# Patient Record
Sex: FEMALE | Race: WHITE
[De-identification: names, ages, dates, MRNs, and addresses within clinical notes are randomized per-mention and may not be internally consistent; named-entity substitution may affect disease eponyms.]

---

## 2018-02-15 ENCOUNTER — HOSPITAL ENCOUNTER (OUTPATIENT)
Dept: HOSPITAL 90 - SHCH | Age: 83
Discharge: HOME | End: 2018-02-15
Attending: INTERNAL MEDICINE
Payer: MEDICARE

## 2018-02-15 DIAGNOSIS — I48.91: Primary | ICD-10-CM

## 2018-02-15 PROCEDURE — 93306 TTE W/DOPPLER COMPLETE: CPT

## 2018-03-07 ENCOUNTER — HOSPITAL ENCOUNTER (OUTPATIENT)
Dept: HOSPITAL 90 - OIH | Age: 83
Discharge: HOME | End: 2018-03-07
Attending: INTERNAL MEDICINE
Payer: COMMERCIAL

## 2018-03-07 DIAGNOSIS — Z13.6: Primary | ICD-10-CM

## 2018-03-07 PROCEDURE — 75571 CT HRT W/O DYE W/CA TEST: CPT

## 2021-12-22 ENCOUNTER — HOSPITAL ENCOUNTER (EMERGENCY)
Dept: HOSPITAL 47 - EC | Age: 86
Discharge: HOME | End: 2021-12-22
Payer: MEDICARE

## 2021-12-22 VITALS — DIASTOLIC BLOOD PRESSURE: 51 MMHG | SYSTOLIC BLOOD PRESSURE: 136 MMHG | HEART RATE: 58 BPM

## 2021-12-22 VITALS — RESPIRATION RATE: 16 BRPM

## 2021-12-22 VITALS — TEMPERATURE: 97.9 F

## 2021-12-22 DIAGNOSIS — Z20.822: ICD-10-CM

## 2021-12-22 DIAGNOSIS — Z86.73: ICD-10-CM

## 2021-12-22 DIAGNOSIS — I50.9: Primary | ICD-10-CM

## 2021-12-22 DIAGNOSIS — Z79.01: ICD-10-CM

## 2021-12-22 DIAGNOSIS — E78.5: ICD-10-CM

## 2021-12-22 DIAGNOSIS — Z79.899: ICD-10-CM

## 2021-12-22 LAB
ALBUMIN SERPL-MCNC: 4.2 G/DL (ref 3.5–5)
ALP SERPL-CCNC: 73 U/L (ref 38–126)
ALT SERPL-CCNC: 14 U/L (ref 4–34)
ANION GAP SERPL CALC-SCNC: 11 MMOL/L
AST SERPL-CCNC: 36 U/L (ref 14–36)
BUN SERPL-SCNC: 16 MG/DL (ref 7–17)
CALCIUM SPEC-MCNC: 9.6 MG/DL (ref 8.4–10.2)
CELLS COUNTED: 100
CHLORIDE SERPL-SCNC: 104 MMOL/L (ref 98–107)
CK SERPL-CCNC: 76 U/L (ref 30–135)
CO2 SERPL-SCNC: 21 MMOL/L (ref 22–30)
ERYTHROCYTE [DISTWIDTH] IN BLOOD BY AUTOMATED COUNT: 4.35 M/UL (ref 3.8–5.4)
ERYTHROCYTE [DISTWIDTH] IN BLOOD: 15.4 % (ref 11.5–15.5)
GLUCOSE SERPL-MCNC: 106 MG/DL (ref 74–99)
HCT VFR BLD AUTO: 43.7 % (ref 34–46)
HGB BLD-MCNC: 14.2 GM/DL (ref 11.4–16)
LYMPHOCYTES # BLD MANUAL: 2.05 K/UL (ref 1–4.8)
MAGNESIUM SPEC-SCNC: 1.8 MG/DL (ref 1.6–2.3)
MCH RBC QN AUTO: 32.6 PG (ref 25–35)
MCHC RBC AUTO-ENTMCNC: 32.5 G/DL (ref 31–37)
MCV RBC AUTO: 100.4 FL (ref 80–100)
MONOCYTES # BLD MANUAL: 0.51 K/UL (ref 0–1)
NEUTROPHILS NFR BLD MANUAL: 60 %
NEUTS SEG # BLD MANUAL: 3.84 K/UL (ref 1.3–7.7)
PLATELET # BLD AUTO: 197 K/UL (ref 150–450)
POTASSIUM SERPL-SCNC: 4.6 MMOL/L (ref 3.5–5.1)
PROT SERPL-MCNC: 8 G/DL (ref 6.3–8.2)
SODIUM SERPL-SCNC: 136 MMOL/L (ref 137–145)
WBC # BLD AUTO: 6.4 K/UL (ref 3.8–10.6)

## 2021-12-22 PROCEDURE — 87635 SARS-COV-2 COVID-19 AMP PRB: CPT

## 2021-12-22 PROCEDURE — 36415 COLL VENOUS BLD VENIPUNCTURE: CPT

## 2021-12-22 PROCEDURE — 82550 ASSAY OF CK (CPK): CPT

## 2021-12-22 PROCEDURE — 99283 EMERGENCY DEPT VISIT LOW MDM: CPT

## 2021-12-22 PROCEDURE — 83735 ASSAY OF MAGNESIUM: CPT

## 2021-12-22 PROCEDURE — 71046 X-RAY EXAM CHEST 2 VIEWS: CPT

## 2021-12-22 PROCEDURE — 85025 COMPLETE CBC W/AUTO DIFF WBC: CPT

## 2021-12-22 PROCEDURE — 87636 SARSCOV2 & INF A&B AMP PRB: CPT

## 2021-12-22 PROCEDURE — 83880 ASSAY OF NATRIURETIC PEPTIDE: CPT

## 2021-12-22 PROCEDURE — 80053 COMPREHEN METABOLIC PANEL: CPT

## 2021-12-22 NOTE — ED
URI HPI





- General


Chief Complaint: Upper Respiratory Infection


Stated Complaint: covid+, bam infusion


Time Seen by Provider: 12/22/21 13:43


Source: patient, family, RN notes reviewed


Mode of arrival: wheelchair


Limitations: no limitations





- History of Present Illness


Initial Comments: 





BA-year-old female with history of CVA who is brought in by daughter after being

diagnosed with being tired having difficulty breathing.  Patient does have 

peripheral edema she recently had her Lasix dosage lowered from twice a day to 

once a day.  No fevers chills nausea vomiting sweats just tired for last couple 

days.  Patient is hard of hearing.


MD Complaint: other





- Related Data


                                Home Medications











 Medication  Instructions  Recorded  Confirmed


 


Acetaminophen Tab [Tylenol Tab] 500 mg PO Q6HR PRN 12/22/21 12/22/21


 


Apixaban [Eliquis] 2.5 mg PO BID@0800,2000 12/22/21 12/22/21


 


Atorvastatin [Lipitor] 40 mg PO HS@2000 12/22/21 12/22/21


 


Bacitracin Ointment 1 applic TOPICAL TID PRN 12/22/21 12/22/21


 


Furosemide [Lasix] 40 mg PO DAILY@0800 12/22/21 12/22/21


 


Losartan Potassium [Cozaar] 25 mg PO DAILY@0800 12/22/21 12/22/21


 


Magnesium Hydroxide [Milk of 2,400 mg PO Q4H PRN 12/22/21 12/22/21





Magnesia]   


 


Magnesium Oxide [Mag-Ox] 400 mg PO DAILY@0800 12/22/21 12/22/21


 


Metoprolol Tartrate [Lopressor] 100 mg PO DAILY@0800 12/22/21 12/22/21


 


Polyethylene Glycol 3350 [Miralax] 17 gm PO DAILY@0800 12/22/21 12/22/21


 


Potassium Chloride ER [K-Dur 10] 10 meq PO DAILY@0800 12/22/21 12/22/21


 


Sennosides/Docusate Sodium 1 tab PO BID PRN 12/22/21 12/22/21





[Senna-S 8.6-50 mg Tablet]   


 


amLODIPine [Norvasc] 10 mg PO DAILY@0800 12/22/21 12/22/21








                                  Previous Rx's











 Medication  Instructions  Recorded


 


Furosemide [Lasix] 40 mg PO BID #14 tablet 12/22/21


 


Potassium Chloride ER [K-Dur 20] 20 meq PO DAILY #7 tab 12/22/21











                                    Allergies











Allergy/AdvReac Type Severity Reaction Status Date / Time


 


No Known Allergies Allergy   Verified 12/22/21 15:20














Review of Systems


ROS Statement: 


Those systems with pertinent positive or pertinent negative responses have been 

documented in the HPI.





ROS Other: All systems not noted in ROS Statement are negative.





Past Medical History


Past Medical History: Atrial Flutter, CVA/TIA, Hyperlipidemia


History of Any Multi-Drug Resistant Organisms: None Reported


Past Surgical History: No Surgical Hx Reported


Additional Past Surgical History / Comment(s): cataracts


Past Psychological History: No Psychological Hx Reported


Smoking Status: Never smoker


Past Alcohol Use History: None Reported


Past Drug Use History: None Reported





General Exam





- General Exam Comments


Initial Comments: 





This is a well-developed asthenic appearing female who is awake alert oriented 

3


Limitations: no limitations


General appearance: alert, in no apparent distress


Head exam: Present: atraumatic, normocephalic, normal inspection


Eye exam: Present: normal appearance, PERRL, EOMI.  Absent: scleral icterus, 

conjunctival injection, periorbital swelling


ENT exam: Present: normal exam, mucous membranes moist


Neck exam: Present: normal inspection, full ROM, other.  Absent: tenderness, 

meningismus, lymphadenopathy


Respiratory exam: Present: rales, decreased breath sounds (No stridor JVD or 

bruits).  Absent: respiratory distress, wheezes, rhonchi, stridor


Cardiovascular Exam: Present: regular rate, normal rhythm, normal heart sounds. 

Absent: systolic murmur, diastolic murmur, rubs, gallop, clicks


GI/Abdominal exam: Present: soft, normal bowel sounds.  Absent: distended, 

tenderness, guarding, rebound, rigid


Extremities exam: Present: full ROM, normal capillary refill, pedal edema.  

Absent: tenderness, joint swelling, calf tenderness


Back exam: Present: normal inspection


Neurological exam: Present: alert, oriented X3, CN II-XII intact


Psychiatric exam: Present: normal affect, normal mood


Skin exam: Present: warm, dry, intact, normal color.  Absent: rash





Course


                                   Vital Signs











  12/22/21 12/22/21





  11:27 14:12


 


Temperature 97.9 F 


 


Pulse Rate 59 L 55 L


 


Respiratory 20 16





Rate  


 


Blood Pressure 125/63 154/68


 


O2 Sat by Pulse 95 96





Oximetry  














Medical Decision Making





- Medical Decision Making





I did discuss the findings with the patient and family patient will be 

discharged home the patient will be an additional Lasix she was on 40 mg twice a

day be more she also place an extra potassium for 1 week and follow-up with her 

doctor we did discuss return parameters.





- Lab Data


Result diagrams: 


                                 12/22/21 14:18





                                 12/22/21 14:18


                                   Lab Results











  12/22/21 12/22/21 12/22/21 Range/Units





  11:35 14:18 14:18 


 


WBC   6.4   (3.8-10.6)  k/uL


 


RBC   4.35   (3.80-5.40)  m/uL


 


Hgb   14.2   (11.4-16.0)  gm/dL


 


Hct   43.7   (34.0-46.0)  %


 


MCV   100.4 H   (80.0-100.0)  fL


 


MCH   32.6   (25.0-35.0)  pg


 


MCHC   32.5   (31.0-37.0)  g/dL


 


RDW   15.4   (11.5-15.5)  %


 


Plt Count   197   (150-450)  k/uL


 


MPV   8.2   


 


Neutrophils % (Manual)   60   %


 


Lymphocytes % (Manual)   32   %


 


Monocytes % (Manual)   8   %


 


Neutrophils # (Manual)   3.84   (1.3-7.7)  k/uL


 


Lymphocytes # (Manual)   2.05   (1.0-4.8)  k/uL


 


Monocytes # (Manual)   0.51   (0-1.0)  k/uL


 


Nucleated RBCs   0   (0-0)  /100 WBC


 


Manual Slide Review   Performed   


 


Reactive Lymphocytes   Present   


 


Macrocytosis   Slight   


 


Crenated Cell   Present   


 


Sodium    136 L  (137-145)  mmol/L


 


Potassium    4.6  (3.5-5.1)  mmol/L


 


Chloride    104  ()  mmol/L


 


Carbon Dioxide    21 L  (22-30)  mmol/L


 


Anion Gap    11  mmol/L


 


BUN    16  (7-17)  mg/dL


 


Creatinine    0.73  (0.52-1.04)  mg/dL


 


Est GFR (CKD-EPI)AfAm    85  (>60 ml/min/1.73 sqM)  


 


Est GFR (CKD-EPI)NonAf    73  (>60 ml/min/1.73 sqM)  


 


Glucose    106 H  (74-99)  mg/dL


 


Calcium    9.6  (8.4-10.2)  mg/dL


 


Magnesium    1.8  (1.6-2.3)  mg/dL


 


Total Bilirubin    1.3  (0.2-1.3)  mg/dL


 


AST    36  (14-36)  U/L


 


ALT    14  (4-34)  U/L


 


Alkaline Phosphatase    73  ()  U/L


 


Creatine Kinase    76  ()  U/L


 


NT-Pro-B Natriuret Pep     pg/mL


 


Total Protein    8.0  (6.3-8.2)  g/dL


 


Albumin    4.2  (3.5-5.0)  g/dL


 


Coronavirus (PCR)  Not Detected    (Not Detectd)  


 


Influenza Type A (PCR)     (Not Detectd)  


 


Influenza Type B (PCR)     (Not Detectd)  


 


RSV (PCR)     (Not Detectd)  


 


SARS-CoV-2 (PCR)     (Not Detectd)  














  12/22/21 12/22/21 Range/Units





  14:18 14:18 


 


WBC    (3.8-10.6)  k/uL


 


RBC    (3.80-5.40)  m/uL


 


Hgb    (11.4-16.0)  gm/dL


 


Hct    (34.0-46.0)  %


 


MCV    (80.0-100.0)  fL


 


MCH    (25.0-35.0)  pg


 


MCHC    (31.0-37.0)  g/dL


 


RDW    (11.5-15.5)  %


 


Plt Count    (150-450)  k/uL


 


MPV    


 


Neutrophils % (Manual)    %


 


Lymphocytes % (Manual)    %


 


Monocytes % (Manual)    %


 


Neutrophils # (Manual)    (1.3-7.7)  k/uL


 


Lymphocytes # (Manual)    (1.0-4.8)  k/uL


 


Monocytes # (Manual)    (0-1.0)  k/uL


 


Nucleated RBCs    (0-0)  /100 WBC


 


Manual Slide Review    


 


Reactive Lymphocytes    


 


Macrocytosis    


 


Crenated Cell    


 


Sodium    (137-145)  mmol/L


 


Potassium    (3.5-5.1)  mmol/L


 


Chloride    ()  mmol/L


 


Carbon Dioxide    (22-30)  mmol/L


 


Anion Gap    mmol/L


 


BUN    (7-17)  mg/dL


 


Creatinine    (0.52-1.04)  mg/dL


 


Est GFR (CKD-EPI)AfAm    (>60 ml/min/1.73 sqM)  


 


Est GFR (CKD-EPI)NonAf    (>60 ml/min/1.73 sqM)  


 


Glucose    (74-99)  mg/dL


 


Calcium    (8.4-10.2)  mg/dL


 


Magnesium    (1.6-2.3)  mg/dL


 


Total Bilirubin    (0.2-1.3)  mg/dL


 


AST    (14-36)  U/L


 


ALT    (4-34)  U/L


 


Alkaline Phosphatase    ()  U/L


 


Creatine Kinase    ()  U/L


 


NT-Pro-B Natriuret Pep  2790   pg/mL


 


Total Protein    (6.3-8.2)  g/dL


 


Albumin    (3.5-5.0)  g/dL


 


Coronavirus (PCR)    (Not Detectd)  


 


Influenza Type A (PCR)   Not Detected  (Not Detectd)  


 


Influenza Type B (PCR)   Not Detected  (Not Detectd)  


 


RSV (PCR)   Not Detected  (Not Detectd)  


 


SARS-CoV-2 (PCR)   Not Detected  (Not Detectd)  














- Radiology Data


Radiology results: report reviewed, image reviewed





Disposition


Clinical Impression: 


 Congestive heart failure (CHF)





Disposition: HOME SELF-CARE


Condition: Stable


Prescriptions: 


Potassium Chloride ER [K-Dur 20] 20 meq PO DAILY #7 tab


Furosemide [Lasix] 40 mg PO BID #14 tablet


Is patient prescribed a controlled substance at d/c from ED?: No


Referrals: 


Nonstaff,Physician [Primary Care Provider] - 1-2 days

## 2021-12-22 NOTE — XR
EXAMINATION TYPE: XR chest 2V

 

DATE OF EXAM: 12/22/2021

 

COMPARISON: None

 

HISTORY: 89-year-old female with cough

 

TECHNIQUE:  PA and lateral views

 

FINDINGS:  

Enlarged cardiac/pericardiac silhouette. Interstitial prominence. Small effusions and patchy left bas
ilar opacity.

 

 

IMPRESSION:  

Moderate enlargement of the cardiac/pericardiac silhouette. This could represent pronounced cardiomeg
sera and/or underlying pericardial effusion. Small pleural effusions with adjacent atelectasis and/or 
consolidation.